# Patient Record
Sex: MALE | URBAN - NONMETROPOLITAN AREA
[De-identification: names, ages, dates, MRNs, and addresses within clinical notes are randomized per-mention and may not be internally consistent; named-entity substitution may affect disease eponyms.]

---

## 2022-08-29 ENCOUNTER — APPOINTMENT (RX ONLY)
Dept: URBAN - NONMETROPOLITAN AREA CLINIC 17 | Facility: CLINIC | Age: 77
Setting detail: DERMATOLOGY
End: 2022-08-29

## 2022-08-29 DIAGNOSIS — L259 CONTACT DERMATITIS AND OTHER ECZEMA, UNSPECIFIED CAUSE: ICD-10-CM

## 2022-08-29 DIAGNOSIS — B07.8 OTHER VIRAL WARTS: ICD-10-CM

## 2022-08-29 PROBLEM — L23.9 ALLERGIC CONTACT DERMATITIS, UNSPECIFIED CAUSE: Status: ACTIVE | Noted: 2022-08-29

## 2022-08-29 PROCEDURE — ? DEFER

## 2022-08-29 PROCEDURE — ? TREATMENT REGIMEN

## 2022-08-29 PROCEDURE — ? PRESCRIPTION

## 2022-08-29 PROCEDURE — ? COUNSELING

## 2022-08-29 PROCEDURE — 99203 OFFICE O/P NEW LOW 30 MIN: CPT

## 2022-08-29 PROCEDURE — ? ADDITIONAL NOTES

## 2022-08-29 RX ORDER — HYDROXYZINE HYDROCHLORIDE 25 MG/1
TABLET, FILM COATED ORAL
Qty: 30 | Refills: 1 | Status: ERX | COMMUNITY
Start: 2022-08-29

## 2022-08-29 RX ORDER — CLOBETASOL PROPIONATE 0.5 MG/G
OINTMENT TOPICAL
Qty: 60 | Refills: 2 | Status: ERX | COMMUNITY
Start: 2022-08-29

## 2022-08-29 RX ADMIN — HYDROXYZINE HYDROCHLORIDE: 25 TABLET, FILM COATED ORAL at 00:00

## 2022-08-29 RX ADMIN — CLOBETASOL PROPIONATE: 0.5 OINTMENT TOPICAL at 00:00

## 2022-08-29 ASSESSMENT — LOCATION SIMPLE DESCRIPTION DERM
LOCATION SIMPLE: LEFT UPPER BACK
LOCATION SIMPLE: RIGHT FOREARM

## 2022-08-29 ASSESSMENT — LOCATION ZONE DERM
LOCATION ZONE: TRUNK
LOCATION ZONE: ARM

## 2022-08-29 ASSESSMENT — LOCATION DETAILED DESCRIPTION DERM
LOCATION DETAILED: RIGHT VENTRAL PROXIMAL FOREARM
LOCATION DETAILED: LEFT MID-UPPER BACK

## 2022-08-29 NOTE — PROCEDURE: TREATMENT REGIMEN
Detail Level: Zone
Initiate Treatment: Begin the following treatment(s): clobetasol 0.05 % topical ointment \\nQuantity: 60.0 g  Days Supply: 30\\n\\nhydroxyzine HCl 25 mg tablet \\nQuantity: 30.0 Tablet

## 2022-08-29 NOTE — PROCEDURE: ADDITIONAL NOTES
Additional Notes: If not responding to treatment, biopsy and patch testing for the next step.
Detail Level: Simple
Render Risk Assessment In Note?: no
Additional Notes: Widely distributed eczematous dermatitis with excoriations to arms/legs. No burrows seen on exam and no close contacts with itching. 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 Will plan to treat with trial of potent topical steroids. However, if not improved will plan for biopsy. \\nAlso, if recurrent and especially if biopsy confirms findings compatible with an allergic contact dermatitis, will plan for patch testing.
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 \\nPatient would like treatment and is amenable to LN, but would like to defer today given cost. He will consider this at follow up.

## 2022-08-29 NOTE — HPI: RASH
What Type Of Note Output Would You Prefer (Optional)?: Standard Output
Is The Patient Presenting As Previously Scheduled?: Yes
How Severe Is Your Rash?: moderate
Is This A New Presentation, Or A Follow-Up?: Rash
Additional History: Hydrocortisone cream helped a little.\\n\\nWorks outside and occasionally has contact with plants. \\nDenies any use of cologne. \\nUses aveeno soap.

## 2022-08-29 NOTE — PROCEDURE: DEFER
Detail Level: Detailed
Introduction Text (Please End With A Colon): The following procedure was deferred: biopsy
X Size Of Lesion In Cm (Optional): 0
Reason To Defer Override: if not improved, benign destruction on the forearm.

## 2022-09-19 ENCOUNTER — APPOINTMENT (RX ONLY)
Dept: URBAN - NONMETROPOLITAN AREA CLINIC 17 | Facility: CLINIC | Age: 77
Setting detail: DERMATOLOGY
End: 2022-09-19

## 2022-09-19 DIAGNOSIS — L259 CONTACT DERMATITIS AND OTHER ECZEMA, UNSPECIFIED CAUSE: ICD-10-CM

## 2022-09-19 DIAGNOSIS — B07.8 OTHER VIRAL WARTS: ICD-10-CM

## 2022-09-19 DIAGNOSIS — D69.2 OTHER NONTHROMBOCYTOPENIC PURPURA: ICD-10-CM

## 2022-09-19 PROBLEM — L23.9 ALLERGIC CONTACT DERMATITIS, UNSPECIFIED CAUSE: Status: ACTIVE | Noted: 2022-09-19

## 2022-09-19 PROCEDURE — 99213 OFFICE O/P EST LOW 20 MIN: CPT

## 2022-09-19 PROCEDURE — ? PRESCRIPTION

## 2022-09-19 PROCEDURE — ? ADDITIONAL NOTES

## 2022-09-19 PROCEDURE — ? COUNSELING

## 2022-09-19 RX ORDER — IMIQUIMOD 12.5 MG/.25G
CREAM TOPICAL
Qty: 12 | Refills: 3 | Status: ERX | COMMUNITY
Start: 2022-09-19

## 2022-09-19 RX ORDER — AMMONIUM LACTATE 12 G/100G
LOTION TOPICAL
Qty: 400 | Refills: 11 | Status: ERX | COMMUNITY
Start: 2022-09-19

## 2022-09-19 RX ADMIN — AMMONIUM LACTATE: 12 LOTION TOPICAL at 00:00

## 2022-09-19 RX ADMIN — IMIQUIMOD: 12.5 CREAM TOPICAL at 00:00

## 2022-09-19 ASSESSMENT — LOCATION SIMPLE DESCRIPTION DERM
LOCATION SIMPLE: LEFT UPPER BACK
LOCATION SIMPLE: UPPER BACK
LOCATION SIMPLE: RIGHT FOREARM

## 2022-09-19 ASSESSMENT — LOCATION ZONE DERM
LOCATION ZONE: ARM
LOCATION ZONE: TRUNK
LOCATION ZONE: TRUNK

## 2022-09-19 ASSESSMENT — LOCATION DETAILED DESCRIPTION DERM
LOCATION DETAILED: RIGHT DISTAL DORSAL FOREARM
LOCATION DETAILED: SUPERIOR THORACIC SPINE
LOCATION DETAILED: LEFT MID-UPPER BACK

## 2022-09-19 NOTE — HPI: SECONDARY COMPLAINT
How Severe Is This Condition?: mild
Additional History: Pt declines cryotherapy. Would like to know if there is a topical that can be used.

## 2022-09-19 NOTE — PROCEDURE: ADDITIONAL NOTES
Detail Level: Simple
Additional Notes: Marked improvement with reduction of dermatitis with small area limited to upper back. \\nItch of zero today.\\nStop hydroxyzine. \\nStop clobetasol and switch to cerave itch relief. \\nOk for pt to refill clobetasol to use if he has a flare after outdoor work as previously. \\nRTC prn.
Render Risk Assessment In Note?: no
Additional Notes: Pt declines liquid nitrogen.\\nDiscussed risks and benefits of imiquimod and counseled on use. \\nPt to apply 5 nights a week until clear, up to 16 weeks. \\nPt to rtc if not resolved.
Additional Notes: Discussed etiology and treatment.\\nRecommend spf and trial of lachydrin.

## 2024-06-04 NOTE — PROCEDURE: COUNSELING
Detail Level: Zone
[No] : No
[Medication education provided] : Medication education provided.
Detail Level: Detailed
[Rationale for medication choices, possible risks/precautions, benefits, alternative treatment choices, and consequences of non-treatment discussed] : Rationale for medication choices, possible risks/precautions, benefits, alternative treatment choices, and consequences of non-treatment discussed with patient/family/caregiver 
[FreeTextEntry4] : Meeting goals of care, is medication and appointment adherent. 
[FreeTextEntry5] : Will continue current plan of care.  Continue Lorazepam ( for anxiety but also psychotic sx have a catatonic quality as well).  Continue Celexa ( pt does worse off this antidepressant in terms of both mood and anxiety sx) Continue Lamotrigine Continue Olanzapine 15 mg and monitor metabolic effects, pt is on both Ozempic and Metformin.